# Patient Record
Sex: FEMALE | Race: WHITE
[De-identification: names, ages, dates, MRNs, and addresses within clinical notes are randomized per-mention and may not be internally consistent; named-entity substitution may affect disease eponyms.]

---

## 2017-02-09 ENCOUNTER — HOSPITAL ENCOUNTER (OUTPATIENT)
Dept: HOSPITAL 44 - LAB | Age: 72
End: 2017-02-09
Attending: FAMILY MEDICINE
Payer: MEDICARE

## 2017-02-09 DIAGNOSIS — I10: Primary | ICD-10-CM

## 2017-02-09 LAB
EGFR (AFRICAN): > 60
EGFR (NON-AFRICAN): > 60

## 2017-02-09 PROCEDURE — 80061 LIPID PANEL: CPT

## 2017-02-09 PROCEDURE — 36415 COLL VENOUS BLD VENIPUNCTURE: CPT

## 2017-02-09 PROCEDURE — 80053 COMPREHEN METABOLIC PANEL: CPT

## 2017-02-10 ENCOUNTER — HOSPITAL ENCOUNTER (OUTPATIENT)
Dept: HOSPITAL 44 - LAB | Age: 72
End: 2017-02-10
Attending: FAMILY MEDICINE
Payer: MEDICARE

## 2017-02-10 DIAGNOSIS — R43.2: Primary | ICD-10-CM

## 2017-02-10 PROCEDURE — 36415 COLL VENOUS BLD VENIPUNCTURE: CPT

## 2017-02-10 PROCEDURE — 84443 ASSAY THYROID STIM HORMONE: CPT

## 2017-02-10 PROCEDURE — 84484 ASSAY OF TROPONIN QUANT: CPT

## 2018-08-31 ENCOUNTER — HOSPITAL ENCOUNTER (OUTPATIENT)
Dept: HOSPITAL 44 - LAB | Age: 73
End: 2018-08-31
Attending: INTERNAL MEDICINE
Payer: MEDICARE

## 2018-08-31 DIAGNOSIS — I48.0: Primary | ICD-10-CM

## 2018-08-31 DIAGNOSIS — I10: ICD-10-CM

## 2018-08-31 LAB
EGFR (AFRICAN): > 60
EGFR (NON-AFRICAN): > 60
MCH RBC QN AUTO: 31.4 PG (ref 28–34)
MCV RBC AUTO: 93.3 FL (ref 80–100)

## 2018-08-31 PROCEDURE — 36415 COLL VENOUS BLD VENIPUNCTURE: CPT

## 2018-08-31 PROCEDURE — 80053 COMPREHEN METABOLIC PANEL: CPT

## 2018-08-31 PROCEDURE — 82306 VITAMIN D 25 HYDROXY: CPT

## 2018-08-31 PROCEDURE — 85027 COMPLETE CBC AUTOMATED: CPT

## 2018-08-31 PROCEDURE — 80061 LIPID PANEL: CPT

## 2018-08-31 PROCEDURE — 83036 HEMOGLOBIN GLYCOSYLATED A1C: CPT

## 2018-08-31 PROCEDURE — 84443 ASSAY THYROID STIM HORMONE: CPT

## 2018-10-14 ENCOUNTER — HOSPITAL ENCOUNTER (INPATIENT)
Dept: HOSPITAL 44 - SOUTH | Age: 73
LOS: 10 days | Discharge: HOME HEALTH | DRG: 93 | End: 2018-10-24
Attending: FAMILY MEDICINE | Admitting: FAMILY MEDICINE
Payer: MEDICARE

## 2018-10-14 VITALS — BODY MASS INDEX: 32.8 KG/M2

## 2018-10-14 DIAGNOSIS — R26.89: Primary | ICD-10-CM

## 2018-10-14 DIAGNOSIS — Z96.651: ICD-10-CM

## 2018-10-14 DIAGNOSIS — I10: ICD-10-CM

## 2018-10-14 PROCEDURE — 97535 SELF CARE MNGMENT TRAINING: CPT

## 2018-10-14 PROCEDURE — 85025 COMPLETE CBC W/AUTO DIFF WBC: CPT

## 2018-10-14 PROCEDURE — 97112 NEUROMUSCULAR REEDUCATION: CPT

## 2018-10-14 PROCEDURE — 73502 X-RAY EXAM HIP UNI 2-3 VIEWS: CPT

## 2018-10-14 PROCEDURE — 80053 COMPREHEN METABOLIC PANEL: CPT

## 2018-10-14 PROCEDURE — 97116 GAIT TRAINING THERAPY: CPT

## 2018-10-14 PROCEDURE — 97110 THERAPEUTIC EXERCISES: CPT

## 2018-10-14 PROCEDURE — A9270 NON-COVERED ITEM OR SERVICE: HCPCS

## 2018-10-14 PROCEDURE — 97530 THERAPEUTIC ACTIVITIES: CPT

## 2018-10-14 RX ADMIN — ACETAMINOPHEN PRN MG: 500 TABLET ORAL at 16:13

## 2018-10-14 RX ADMIN — METOPROLOL TARTRATE SCH MG: 50 TABLET ORAL at 21:13

## 2018-10-14 RX ADMIN — ACETAMINOPHEN PRN MG: 500 TABLET ORAL at 22:18

## 2018-10-14 RX ADMIN — ASPIRIN 325 MG ORAL TABLET SCH MG: 325 PILL ORAL at 21:13

## 2018-10-15 LAB
BASOPHILS NFR BLD: 0.3 % (ref 0–1.5)
EGFR (NON-AFRICAN): > 60
EOSINOPHIL NFR BLD: 3.3 % (ref 0–6.8)
MCH RBC QN AUTO: 30.8 PG (ref 28–34)
MCV RBC AUTO: 93 FL (ref 80–100)
MONOCYTES %: 6 % (ref 0–11)
NEUTROPHILS #: 4.7 # K/UL (ref 1.4–7.7)

## 2018-10-15 RX ADMIN — ASPIRIN SCH MG: 325 TABLET, DELAYED RELEASE ORAL at 20:27

## 2018-10-15 RX ADMIN — ACETAMINOPHEN PRN MG: 500 TABLET ORAL at 07:23

## 2018-10-15 RX ADMIN — METOPROLOL TARTRATE SCH MG: 50 TABLET ORAL at 20:27

## 2018-10-15 RX ADMIN — LORATADINE SCH MG: 10 TABLET ORAL at 07:23

## 2018-10-15 RX ADMIN — METOPROLOL TARTRATE SCH MG: 50 TABLET ORAL at 07:23

## 2018-10-15 RX ADMIN — VITAMIN D, TAB 1000IU (100/BT) SCH UNIT: 25 TAB at 07:25

## 2018-10-15 RX ADMIN — CELECOXIB SCH MG: 100 CAPSULE ORAL at 20:26

## 2018-10-15 RX ADMIN — ASPIRIN 325 MG ORAL TABLET SCH MG: 325 PILL ORAL at 07:23

## 2018-10-15 RX ADMIN — ACETAMINOPHEN PRN MG: 500 TABLET ORAL at 16:30

## 2018-10-15 NOTE — HISTORY AND PHYSICAL REPORT
CHIEF COMPLAINT: 

Status post right total knee replacement.



HISTORY OF PRESENT ILLNESS: 

This is a 73-year-old female who is actually in remarkably good health who 
presented to Dr. Modi's office with a complaint of having had end-stage 
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  Options were discussed with her 
and she decided to proceed with a right total knee replacement which was 
performed on October 10, 2018, at [facility].  She has not had any 
complications.  She did have replacement of her patella also at the same time.  
She comes to our facility for ongoing therapy.  She is on aspirin 325 mg p.o. 
b.i.d. for DVT prophylaxis.  



PAST MEDICAL HISTORY: 

1.   History of allergies.

2.   Hypertension.



PAST SURGICAL HISTORY: 

1.   History of a laparoscopic cholecystectomy.

2.   Open hysterectomy.



CURRENT MEDICATIONS: 

1.    Aspirin 325 mg p.o. b.i.d. since leaving leaving Dr. Modi's office. 

2.    Loratadine 10 mg daily. 

3.    Metoprolol 50 mg p.o. b.i.d.



ALLERGIES: 

She is allergic to no known medications.



SOCIAL HISTORY: 

She is .  She has quite a bit of family support here in town.  Her son, 
Agustin, and daughter, Brenda, are both local and actually quite involved and 
supportive of their mom.



FAMILY HISTORY: 

Both her parents lived to be in their 90s and were actually in fairly good 
condition up to that point.  



REVIEW OF SYSTEMS: 

She says she really has been doing fairly well.  Her blood pressure is well 
controlled and her pain is pretty well controlled currently on the current dose 
of Tylenol.  She really does not want to be on any opiates because she got sick 
and threw up when she had those at [facility]. 

 

PHYSICAL EXAMINATION: 

General:   She is a very pleasant, alert, and oriented 73-year-old female who 
looks to be significantly younger. 

Vital Signs:   Her vital signs have not been obtained yet.  She just got here. 

HEENT:   Head is normocephalic and atraumatic.  Mucous membranes are moist.  
Dentition is in good repair.  

Neck:  No carotid bruits.  No thyroid masses. 

Lungs:  Clear. 

Heart:  Regular. 

Abdomen:  Soft.  She does have a scar from her hysterectomy, which is a midline 
infraumbilical incision.  She has 4 scars which are barely visible from her 
laparoscopic cholecystectomy.  Her abdomen is soft.  No guarding or rebound.

Pelvic Exam:  Deferred. 

Right Knee:   Shows a linear incision over the right anterior knee consistent 
with total knee replacement.  It is secure with subcuticular sutures and Steri-
Strips at the surface.  It is not red.  It is not draining.  It is not dehisced 
at all.   Otherwise, she has no significant pedal edema. 



ASSESSMENT: 

1.   Status post right total knee replacement.

2.   Hypertension, under good control based on review of records from [facility].   We will continue to follow that here. 

3.   Allergies.  Well treated with Claritin. 



PLAN: 

Again, she will take therapy, occupational therapy and physical therapy.  In 
fact, occupational therapy is in there seeing her right now.  



LUIS MD

## 2018-10-16 RX ADMIN — ACETAMINOPHEN PRN MG: 500 TABLET ORAL at 20:49

## 2018-10-16 RX ADMIN — ASPIRIN SCH MG: 325 TABLET, DELAYED RELEASE ORAL at 09:40

## 2018-10-16 RX ADMIN — CELECOXIB SCH MG: 100 CAPSULE ORAL at 09:37

## 2018-10-16 RX ADMIN — ACETAMINOPHEN PRN MG: 500 TABLET ORAL at 01:33

## 2018-10-16 RX ADMIN — ASPIRIN SCH MG: 325 TABLET, DELAYED RELEASE ORAL at 20:49

## 2018-10-16 RX ADMIN — ACETAMINOPHEN PRN MG: 500 TABLET ORAL at 09:37

## 2018-10-16 RX ADMIN — METOPROLOL TARTRATE SCH MG: 50 TABLET ORAL at 20:48

## 2018-10-16 RX ADMIN — ACETAMINOPHEN PRN MG: 500 TABLET ORAL at 15:02

## 2018-10-16 RX ADMIN — VITAMIN D, TAB 1000IU (100/BT) SCH UNIT: 25 TAB at 09:40

## 2018-10-16 RX ADMIN — METOPROLOL TARTRATE SCH MG: 50 TABLET ORAL at 09:39

## 2018-10-16 RX ADMIN — LORATADINE SCH MG: 10 TABLET ORAL at 09:40

## 2018-10-17 RX ADMIN — ACETAMINOPHEN PRN MG: 500 TABLET ORAL at 14:52

## 2018-10-17 RX ADMIN — ACETAMINOPHEN PRN MG: 500 TABLET ORAL at 03:15

## 2018-10-17 RX ADMIN — METOPROLOL TARTRATE SCH MG: 50 TABLET ORAL at 08:46

## 2018-10-17 RX ADMIN — ACETAMINOPHEN PRN MG: 500 TABLET ORAL at 08:45

## 2018-10-17 RX ADMIN — METOPROLOL TARTRATE SCH MG: 50 TABLET ORAL at 20:20

## 2018-10-17 RX ADMIN — ASPIRIN SCH MG: 325 TABLET, DELAYED RELEASE ORAL at 08:46

## 2018-10-17 RX ADMIN — VITAMIN D, TAB 1000IU (100/BT) SCH UNIT: 25 TAB at 08:45

## 2018-10-17 RX ADMIN — ACETAMINOPHEN PRN MG: 500 TABLET ORAL at 20:59

## 2018-10-17 RX ADMIN — CELECOXIB SCH MG: 100 CAPSULE ORAL at 08:46

## 2018-10-17 RX ADMIN — LORATADINE SCH MG: 10 TABLET ORAL at 08:46

## 2018-10-17 RX ADMIN — ASPIRIN SCH MG: 325 TABLET, DELAYED RELEASE ORAL at 20:20

## 2018-10-18 RX ADMIN — METOPROLOL TARTRATE SCH MG: 50 TABLET ORAL at 20:46

## 2018-10-18 RX ADMIN — ASPIRIN SCH MG: 325 TABLET, DELAYED RELEASE ORAL at 09:25

## 2018-10-18 RX ADMIN — VITAMIN D, TAB 1000IU (100/BT) SCH UNIT: 25 TAB at 09:24

## 2018-10-18 RX ADMIN — LORATADINE SCH MG: 10 TABLET ORAL at 09:25

## 2018-10-18 RX ADMIN — ACETAMINOPHEN PRN MG: 325 TABLET, FILM COATED ORAL at 12:26

## 2018-10-18 RX ADMIN — METOPROLOL TARTRATE SCH MG: 50 TABLET ORAL at 09:25

## 2018-10-18 RX ADMIN — ACETAMINOPHEN PRN MG: 500 TABLET ORAL at 05:51

## 2018-10-18 RX ADMIN — ACETAMINOPHEN PRN MG: 325 TABLET, FILM COATED ORAL at 18:13

## 2018-10-18 RX ADMIN — CELECOXIB SCH MG: 100 CAPSULE ORAL at 09:24

## 2018-10-18 RX ADMIN — ASPIRIN SCH MG: 325 TABLET, DELAYED RELEASE ORAL at 20:46

## 2018-10-19 RX ADMIN — METOPROLOL TARTRATE SCH MG: 50 TABLET ORAL at 20:53

## 2018-10-19 RX ADMIN — CELECOXIB SCH MG: 100 CAPSULE ORAL at 08:56

## 2018-10-19 RX ADMIN — ASPIRIN SCH MG: 325 TABLET, DELAYED RELEASE ORAL at 20:53

## 2018-10-19 RX ADMIN — ACETAMINOPHEN PRN MG: 325 TABLET, FILM COATED ORAL at 17:28

## 2018-10-19 RX ADMIN — ASPIRIN SCH MG: 325 TABLET, DELAYED RELEASE ORAL at 08:56

## 2018-10-19 RX ADMIN — VITAMIN D, TAB 1000IU (100/BT) SCH UNIT: 25 TAB at 08:56

## 2018-10-19 RX ADMIN — ACETAMINOPHEN PRN MG: 325 TABLET, FILM COATED ORAL at 08:56

## 2018-10-19 RX ADMIN — METOPROLOL TARTRATE SCH MG: 50 TABLET ORAL at 08:56

## 2018-10-19 RX ADMIN — LORATADINE SCH MG: 10 TABLET ORAL at 08:56

## 2018-10-19 NOTE — DIAGNOSTIC IMAGING REPORT
AMANDA HERNANDEZ 

[facility]

[id number] [facility].[address] [address]

 

 

 

 

Report Submission Date: Oct 19, 2018 11:26:25 AM CDT

Patient       Study

Name:   TAMIKO GARCIA       Date:   Oct 19, 2018 10:44:51 AM CDT

MRN:   [id number]       Modality Type:   DX

Gender:   F       Description:   PELVIS

:   43       Institution:   [facility]

Physician:   AMANDA HERNANDEZ

     Accession:    [id number]

 

 

Examination: Plain film right hip 



History: RT HIP PAIN X2 DAYS S/P ORIF RT KNEE X9 DAYS (Hx) 



Comparison exams: None provided 



Findings: 2 views of the right hip demonstrate normal cortical margins. No 
fracture no dislocation. No soft tissue abnormality. 



Impression: No acute appearing osseous abnormality.

 

Electronically signed on Oct 19, 2018 11:26:25 AM CDT by:

Orlando YIN

## 2018-10-20 RX ADMIN — LORATADINE SCH MG: 10 TABLET ORAL at 08:50

## 2018-10-20 RX ADMIN — VITAMIN D, TAB 1000IU (100/BT) SCH UNIT: 25 TAB at 08:50

## 2018-10-20 RX ADMIN — ASPIRIN SCH MG: 325 TABLET, DELAYED RELEASE ORAL at 21:41

## 2018-10-20 RX ADMIN — ACETAMINOPHEN PRN MG: 325 TABLET, FILM COATED ORAL at 18:16

## 2018-10-20 RX ADMIN — ASPIRIN SCH MG: 325 TABLET, DELAYED RELEASE ORAL at 08:50

## 2018-10-20 RX ADMIN — CELECOXIB SCH MG: 100 CAPSULE ORAL at 08:50

## 2018-10-20 RX ADMIN — ACETAMINOPHEN PRN MG: 325 TABLET, FILM COATED ORAL at 06:23

## 2018-10-20 RX ADMIN — METOPROLOL TARTRATE SCH MG: 50 TABLET ORAL at 08:50

## 2018-10-20 RX ADMIN — METOPROLOL TARTRATE SCH MG: 50 TABLET ORAL at 21:41

## 2018-10-21 RX ADMIN — METOPROLOL TARTRATE SCH MG: 50 TABLET ORAL at 20:45

## 2018-10-21 RX ADMIN — VITAMIN D, TAB 1000IU (100/BT) SCH UNIT: 25 TAB at 09:09

## 2018-10-21 RX ADMIN — ASPIRIN SCH MG: 325 TABLET, DELAYED RELEASE ORAL at 09:08

## 2018-10-21 RX ADMIN — ACETAMINOPHEN PRN MG: 325 TABLET, FILM COATED ORAL at 01:00

## 2018-10-21 RX ADMIN — ACETAMINOPHEN PRN MG: 325 TABLET, FILM COATED ORAL at 17:36

## 2018-10-21 RX ADMIN — LORATADINE SCH MG: 10 TABLET ORAL at 09:09

## 2018-10-21 RX ADMIN — ASPIRIN SCH MG: 325 TABLET, DELAYED RELEASE ORAL at 20:46

## 2018-10-21 RX ADMIN — ACETAMINOPHEN PRN MG: 325 TABLET, FILM COATED ORAL at 06:48

## 2018-10-21 RX ADMIN — CELECOXIB SCH MG: 100 CAPSULE ORAL at 09:09

## 2018-10-21 RX ADMIN — METOPROLOL TARTRATE SCH MG: 50 TABLET ORAL at 09:08

## 2018-10-22 RX ADMIN — VITAMIN D, TAB 1000IU (100/BT) SCH UNIT: 25 TAB at 10:03

## 2018-10-22 RX ADMIN — ACETAMINOPHEN PRN MG: 325 TABLET, FILM COATED ORAL at 13:09

## 2018-10-22 RX ADMIN — LORATADINE SCH MG: 10 TABLET ORAL at 10:02

## 2018-10-22 RX ADMIN — METOPROLOL TARTRATE SCH MG: 50 TABLET ORAL at 10:03

## 2018-10-22 RX ADMIN — ACETAMINOPHEN PRN MG: 325 TABLET, FILM COATED ORAL at 19:00

## 2018-10-22 RX ADMIN — ACETAMINOPHEN PRN MG: 325 TABLET, FILM COATED ORAL at 06:10

## 2018-10-22 RX ADMIN — ASPIRIN SCH MG: 325 TABLET, DELAYED RELEASE ORAL at 21:31

## 2018-10-22 RX ADMIN — CELECOXIB SCH MG: 100 CAPSULE ORAL at 10:01

## 2018-10-22 RX ADMIN — ASPIRIN SCH MG: 325 TABLET, DELAYED RELEASE ORAL at 10:03

## 2018-10-22 RX ADMIN — METOPROLOL TARTRATE SCH MG: 50 TABLET ORAL at 21:31

## 2018-10-23 RX ADMIN — ACETAMINOPHEN PRN MG: 325 TABLET, FILM COATED ORAL at 04:52

## 2018-10-23 RX ADMIN — ACETAMINOPHEN PRN MG: 325 TABLET, FILM COATED ORAL at 11:33

## 2018-10-23 RX ADMIN — LORATADINE SCH MG: 10 TABLET ORAL at 09:24

## 2018-10-23 RX ADMIN — CELECOXIB SCH MG: 100 CAPSULE ORAL at 07:42

## 2018-10-23 RX ADMIN — ACETAMINOPHEN PRN MG: 325 TABLET, FILM COATED ORAL at 17:42

## 2018-10-23 RX ADMIN — ASPIRIN SCH MG: 325 TABLET, DELAYED RELEASE ORAL at 20:10

## 2018-10-23 RX ADMIN — VITAMIN D, TAB 1000IU (100/BT) SCH UNIT: 25 TAB at 09:23

## 2018-10-23 RX ADMIN — ASPIRIN SCH MG: 325 TABLET, DELAYED RELEASE ORAL at 09:23

## 2018-10-23 RX ADMIN — METOPROLOL TARTRATE SCH MG: 50 TABLET ORAL at 09:24

## 2018-10-23 RX ADMIN — METOPROLOL TARTRATE SCH MG: 50 TABLET ORAL at 20:10

## 2018-10-23 NOTE — INPATIENT PROGRESS NOTE
Subjective





- Required Recertification Statement


I anticipate X number of days because-include discharge plan: 1 day





- Review of Systems


Events since last encounter: 





Patient seems to be doing well at this time. States that the use of the CPM is 

OK, not as painful to her hip as when she was doing 95 degrees. BM have been OK.

Appetite has been stable. Not having much pain at this time. Doing PT and OT 

well. BP has been a little soft with systolic in the 80s at times. Patient is 

not symptomatic.


General: Denies: Chills


Cardiovascular: Denies: Chest Pain, Palpitations


Gastrointestinal: Denies: Nausea, Vomiting, Abdominal Pain





Objective





- Exam


Vitals and I&O: 


                                   Vital Signs











Temp  97.9 F   10/23/18 08:15


 


Pulse  98 H  10/23/18 09:00


 


Resp  18   10/23/18 09:00


 


BP  149/65   10/22/18 21:00


 


Pulse Ox  96   10/22/18 21:00











                                 Intake & Output











 10/22/18 10/23/18 10/23/18





 23:59 11:59 23:59


 


Intake Total 900 840 720


 


Balance 900 840 720


 


Weight 83.915 kg  


 


Intake:   


 


  Oral 900 840 720


 


Other:   


 


  Voiding Method Toilet  


 


  # Voids 2 3 1














General: Alert, Oriented to Person, Oriented to Place, Oriented to Time, 

Cooperative


Neck: Supple


Lungs: Clear to auscultation, Normal air movement, Speaks full Sentences.  No: 

Wheezes, Rales, Rhonchi


Cardiovascular: Regular rate, Normal S1, Normal S2, No murmurs


Abdomen: Normal bowel sounds, Soft


Extremities: Other (some swelling to the right knee.)


Skin: Other (resolving echymosis tot he right thigh and knee area.)


Neurological: Normal gait, Other (incision site is healing well)


Psych/Mental Status: Mental status NL





- Results


Results: 


                               Laboratory Results











WBC  7.20 K/ul (4.00-12.00)   10/15/18  06:55    


 


RBC  3.04 M/ul (3.90-5.20)  L  10/15/18  06:55    


 


Hgb  9.3 g/dL (12.0-16.0)  L  10/15/18  06:55    


 


Hct  28.3 % (34.5-46.5)  L  10/15/18  06:55    


 


MCV  93.0 fl (80.0-100.0)   10/15/18  06:55    


 


MCH  30.8 pg (28.0-34.0)   10/15/18  06:55    


 


MCHC  33.1 g/dL (30.0-36.0)   10/15/18  06:55    


 


RDW  13.1 % (11.3-14.3)   10/15/18  06:55    


 


Plt Count  204 K/mm3 (130-400)   10/15/18  06:55    


 


Neut % (Auto)  65.0 % (39.0-79.0)   10/15/18  06:55    


 


Lymph % (Auto)  25.4 % (16.0-50.0)   10/15/18  06:55    


 


Mono % (Auto)  6.0 % (0.0-11.0)   10/15/18  06:55    


 


Eos % (Auto)  3.3 % (0.0-6.8)   10/15/18  06:55    


 


Baso % (Auto)  0.3  (0.0-1.5)   10/15/18  06:55    


 


Neut # (Auto)  4.7 # k/uL (1.4-7.7)   10/15/18  06:55    


 


Lymph # (Auto)  1.8 # k/uL (0.6-4.0)   10/15/18  06:55    


 


Mono # (Auto)  0.4 # k/uL (0.0-0.9)   10/15/18  06:55    


 


Eos # (Auto)  0.2 # k/uL (0.0-0.6)   10/15/18  06:55    


 


Baso # (Auto)  0.0 # k/uL (0.0-0.5)   10/15/18  06:55    


 


Sodium  139 mmol/L (136-145)   10/15/18  06:55    


 


Potassium  3.9 mmol/L (3.5-5.1)   10/15/18  06:55    


 


Chloride  104 mmol/L ()   10/15/18  06:55    


 


Carbon Dioxide  27 mmol/L (22-30)   10/15/18  06:55    


 


BUN  12 mg/dL (7-17)   10/15/18  06:55    


 


Creatinine  0.80 mg/dL (0.52-1.04)   10/15/18  06:55    


 


Estimated Creat Clear  96   10/15/18  06:55    


 


Est GFR ( Amer)  > 60  (60-)  10/15/18  06:55    


 


Est GFR (Non-Af Amer)  > 60  (60-)  10/15/18  06:55    


 


Glucose  94 mg/dL ()   10/15/18  06:55    


 


Calcium  8.1 mg/dL (8.4-10.2)  L  10/15/18  06:55    


 


Total Bilirubin  0.6 mg/dL (0.2-1.3)   10/15/18  06:55    


 


AST  22 U/L (15-46)   10/15/18  06:55    


 


ALT  37 U/L (13-69)   10/15/18  06:55    


 


Alkaline Phosphatase  51 U/L ()   10/15/18  06:55    


 


Total Protein  5.1 g/dL (6.3-8.2)  L  10/15/18  06:55    


 


Albumin  2.8 g/dL (3.5-5.0)  L  10/15/18  06:55    

















Assessment/Plan





- Assessment/Plan


(1) Gait difficulty


Status: Acute   Current Visit: Yes   


Assessment: 


Patient doing well. Continue with present treatment








(2) S/P total knee replacement


Status: Acute   Current Visit: Yes   


Assessment: 


stable with no signs of infection








(3) Essential hypertension


Status: Acute   Current Visit: Yes   


Assessment: 


Will decrease metoprolol to 25mg BID because BP is down some.

## 2018-10-24 VITALS — SYSTOLIC BLOOD PRESSURE: 150 MMHG | DIASTOLIC BLOOD PRESSURE: 74 MMHG

## 2018-10-24 RX ADMIN — VITAMIN D, TAB 1000IU (100/BT) SCH UNIT: 25 TAB at 08:51

## 2018-10-24 RX ADMIN — LORATADINE SCH MG: 10 TABLET ORAL at 08:51

## 2018-10-24 RX ADMIN — METOPROLOL TARTRATE SCH MG: 50 TABLET ORAL at 08:50

## 2018-10-24 RX ADMIN — ACETAMINOPHEN PRN MG: 325 TABLET, FILM COATED ORAL at 00:46

## 2018-10-24 RX ADMIN — ASPIRIN SCH MG: 325 TABLET, DELAYED RELEASE ORAL at 08:50

## 2018-10-24 RX ADMIN — ACETAMINOPHEN PRN MG: 325 TABLET, FILM COATED ORAL at 06:30

## 2018-10-24 RX ADMIN — CELECOXIB SCH MG: 100 CAPSULE ORAL at 07:43

## 2018-10-29 NOTE — DISCHARGE SUMMARY
Discharge Summary





- Discharge Sumary


History of Present Illness: 





74yo white female who had developed some end stage degenerative changes to her 

right knee. She underwent elective total knee and patellar replacement. Patient 

did not have any intraoperative or post operative complications. Patient was 

transferred to this institution further rehab services. 


Condition at Discharge: Stable


Home Medications: 


                                Ambulatory Orders











 Medication  Instructions  Recorded


 


Acetaminophen [Tylenol] 650 mg PO Q6H PRN #100 tablet 10/23/18


 


Aspirin EC [Ecotrin] 325 mg PO BID  tablet. 10/23/18


 


Cholecalciferol [Vitamin D-3] 3,000 unit PO DAILY  tablet 10/23/18


 


Metoprolol Tartrate [Lopressor] 50 mg PO BID  tablet 10/23/18











Consultations this Visit: None


Procedures this Visit: None


Allergies/Adverse Reactions: 


                                    Allergies











Allergy/AdvReac Type Severity Reaction Status Date / Time


 


No Known Drug Allergies Allergy   Verified 10/19/18 20:50











Discharge Summary: 





Patient did well during her stay. She participated with PT and OT and at the 

time of discharge was ambulating and transferring better.  Patient did not have 

any problems with her other chronic medical conditions.  HTN remained stable. At

 the time of discharge it was felt that the patient will benefit from further PT

 and OT through home health. This was arranged. Patient was encouraged to 

continue with passive range of motion as ordered for one more week. Patient has 

a follow-up appointment with orthopedics.  





- Final Diagnosis


(1) Gait difficulty


Problems: 


improved








(2) S/P total knee replacement


Problems: 


stable








(3) Essential hypertension


Problems: 


Stable on home meds